# Patient Record
Sex: FEMALE | Race: WHITE | NOT HISPANIC OR LATINO | Employment: FULL TIME | URBAN - NONMETROPOLITAN AREA
[De-identification: names, ages, dates, MRNs, and addresses within clinical notes are randomized per-mention and may not be internally consistent; named-entity substitution may affect disease eponyms.]

---

## 2023-05-31 ENCOUNTER — OFFICE VISIT (OUTPATIENT)
Dept: CARDIOLOGY | Facility: CLINIC | Age: 33
End: 2023-05-31

## 2023-05-31 VITALS
HEIGHT: 66 IN | OXYGEN SATURATION: 99 % | SYSTOLIC BLOOD PRESSURE: 124 MMHG | BODY MASS INDEX: 26.03 KG/M2 | HEART RATE: 95 BPM | DIASTOLIC BLOOD PRESSURE: 70 MMHG | WEIGHT: 162 LBS

## 2023-05-31 DIAGNOSIS — G47.33 OSA (OBSTRUCTIVE SLEEP APNEA): Primary | ICD-10-CM

## 2023-05-31 DIAGNOSIS — G47.10 HYPERSOMNIA, UNSPECIFIED: ICD-10-CM

## 2023-05-31 RX ORDER — GUANFACINE 1 MG/1
1 TABLET, EXTENDED RELEASE ORAL
COMMUNITY
Start: 2023-05-22

## 2023-05-31 NOTE — PROGRESS NOTES
Follow-Up Sleep Consult     Date:   2023  Name: Agnieszka Boggs  :   1990  PCP: Casey Haque PA    Chief Complaint   Patient presents with   • Sleep Apnea       Subjective     History of Present Illness  Agnieszka Boggs is a 32 y.o. female who presents today for follow-up on TERRY.  At her last visit her home sleep test was reviewed in detail showing that she had mild sleep apnea.  She was asked to start a trial of auto CPAP therapy.    At today's visit she presents for follow-up on new CPAP therapy for 31 to 90-day compliance download.  She reports she has been on PAP therapy for about 2-1/2 months and she reports that she is adjusting to PAP therapy.    She reports that her airflow is comfortable.  She reports she likes the ramp, low airflow in the beginning.  She reports that she is tried using a fullface mask, but she was not able to tolerate the full facemask.  She is now using a nasal mask.  She reports that before CPAP therapy she was napping on the weekends.  Now she reports that she is rarely taking a nap.  She reports her DME has been helpful with supplies.    No specialty comments available.    History of TERRY with a baseline AHI of 6 on a home sleep test January 10, 2023.       Current Treatment: Auto CPAP 6 to 20 cm    Current mask is nasal mask                The patient's relevant past medical, surgical, family, and social history reviewed and updated in Epic as appropriate.    Past Medical History:   Diagnosis Date   • Hypersomnia    • TERRY (obstructive sleep apnea)    • Psychiatric illness    • Snoring      Past Surgical History:   Procedure Laterality Date   • APPENDECTOMY     •  SECTION      x3     OB History    No obstetric history on file.       No Known Allergies  Prior to Admission medications    Medication Sig Start Date End Date Taking? Authorizing Provider   guanFACINE HCl ER (INTUNIV) 1 MG tablet sustained-release 24 hour Take 1 mg by mouth Tonight. 23   "Yes Provider, MD Phyllis     Family History   Problem Relation Age of Onset   • Anxiety disorder Mother    • Thyroid disease Mother    • Hypertension Father    • Sleep apnea Father    • Anxiety disorder Sister    • Depression Sister        Objective     Vital Signs:  /70 (BP Location: Left arm, Patient Position: Sitting)   Pulse 95   Ht 167.6 cm (66\")   Wt 73.5 kg (162 lb)   SpO2 99%   BMI 26.15 kg/m²     BMI is >= 25 and <30. (Overweight) The following options were offered after discussion;: referral to primary care        Physical Exam  Constitutional:       Appearance: Normal appearance.   HENT:      Head: Normocephalic.   Pulmonary:      Effort: Pulmonary effort is normal.   Musculoskeletal:      Cervical back: Neck supple.   Neurological:      Mental Status: She is alert and oriented to person, place, and time.   Psychiatric:         Mood and Affect: Mood normal.         Behavior: Behavior normal.         Thought Content: Thought content normal.         The following data was reviewed by: ALFONSO Berg on 05/31/2023:    PAP download reviewed: CPAP download dated May 1 through May 30, 2023.  30-day download.  I have reviewed and interpreted the data.         Assessment and Plan     Diagnoses and all orders for this visit:    1. TERRY (obstructive sleep apnea) (Primary)  Assessment & Plan:  Baseline AHI 6.  This is mild sleep apnea.  She is on new auto CPAP and this is her 31 to 90-day visit.  She has been on CPAP for about 2-1/2 months.    Download is reviewed with excellent control and excellent compliance.  She is benefiting from CPAP therapy and we plan to continue CPAP therapy.    Prescription for CPAP supplies and a CPAP pressure adjustment to include 7 to 12 cm.    Follow-up in 3 months for reevaluation on new pressures.    Orders:  -     PAP Therapy    2. Hypersomnia, unspecified  Assessment & Plan:  She reports that her  reports she is no longer snoring.  She reports more " energy.  She reports sleeping more.  She reports that she rarely takes a nap.  Overall she reports much improvement on PAP therapy.        Report if any new/changing symptoms immediately and Increase pap therapy usage         Follow Up  Return in about 3 months (around 8/31/2023) for TERRY/pressure change .  Patient was given instructions and counseling regarding her condition or for health maintenance advice. Please see specific information pulled into the AVS if appropriate.

## 2023-05-31 NOTE — ASSESSMENT & PLAN NOTE
Baseline AHI 6.  This is mild sleep apnea.  She is on new auto CPAP and this is her 31 to 90-day visit.  She has been on CPAP for about 2-1/2 months.    Download is reviewed with excellent control and excellent compliance.  She is benefiting from CPAP therapy and we plan to continue CPAP therapy.    Prescription for CPAP supplies and a CPAP pressure adjustment to include 7 to 12 cm.    Follow-up in 3 months for reevaluation on new pressures.

## 2023-05-31 NOTE — ASSESSMENT & PLAN NOTE
She reports that her  reports she is no longer snoring.  She reports more energy.  She reports sleeping more.  She reports that she rarely takes a nap.  Overall she reports much improvement on PAP therapy.